# Patient Record
Sex: MALE | Race: OTHER | HISPANIC OR LATINO | URBAN - METROPOLITAN AREA
[De-identification: names, ages, dates, MRNs, and addresses within clinical notes are randomized per-mention and may not be internally consistent; named-entity substitution may affect disease eponyms.]

---

## 2018-07-14 ENCOUNTER — EMERGENCY (EMERGENCY)
Facility: HOSPITAL | Age: 48
LOS: 1 days | Discharge: HOME | End: 2018-07-14
Attending: EMERGENCY MEDICINE
Payer: COMMERCIAL

## 2018-07-14 VITALS
SYSTOLIC BLOOD PRESSURE: 137 MMHG | WEIGHT: 184.97 LBS | OXYGEN SATURATION: 98 % | TEMPERATURE: 98 F | HEIGHT: 72 IN | RESPIRATION RATE: 19 BRPM | DIASTOLIC BLOOD PRESSURE: 79 MMHG | HEART RATE: 77 BPM

## 2018-07-14 VITALS
RESPIRATION RATE: 17 BRPM | SYSTOLIC BLOOD PRESSURE: 119 MMHG | DIASTOLIC BLOOD PRESSURE: 75 MMHG | OXYGEN SATURATION: 98 % | TEMPERATURE: 98 F | HEART RATE: 68 BPM

## 2018-07-14 PROCEDURE — 73564 X-RAY EXAM KNEE 4 OR MORE: CPT | Mod: 26,LT

## 2018-07-14 PROCEDURE — 99283 EMERGENCY DEPT VISIT LOW MDM: CPT

## 2018-07-14 PROCEDURE — 73564 X-RAY EXAM KNEE 4 OR MORE: CPT

## 2018-07-14 RX ORDER — COLCHICINE 0.6 MG
1.2 TABLET ORAL ONCE
Qty: 0 | Refills: 0 | Status: COMPLETED | OUTPATIENT
Start: 2018-07-14 | End: 2018-07-14

## 2018-07-14 RX ORDER — COLCHICINE 0.6 MG
1 TABLET ORAL
Qty: 10 | Refills: 0 | OUTPATIENT
Start: 2018-07-14 | End: 2018-07-18

## 2018-07-14 RX ADMIN — Medication 1.2 MILLIGRAM(S): at 20:06

## 2018-07-14 NOTE — ED PROVIDER NOTE - PROGRESS NOTE DETAILS
Xray w/ small knee effusion. Will d/c home with colchicine, NSAIDs, PCP f/u  Spoke with patient extensively regarding current differential diagnosis for ongoing symptoms, and patient acknowledged understanding. Discussed plan for care and patient is in agreement.   Will provide strict return precautions and medication counseling.   Dany Alexander MD, PGY2 Xray w/ small knee effusion. Will d/c home with colchicine, NSAIDs, PCP f/u  Spoke with patient extensively regarding current differential diagnosis for ongoing symptoms, and patient acknowledged understanding. Discussed plan for care and patient is in agreement.   Will provide strict return precautions and medication counseling.   Dany Alexander MD, PGY2  **ATTENDING ADDENDUM (Dr. Ector Wing): agree with above notation by EM resident Catherine. Agree with goals/plan of ED care as described in EMR, including diagnostics, therapeutics and consultation as clinically warranted. Considered joint aspiration, but deferred this intervention as the soft-tissue swelling is not severe. Anticipatory guidance provided by ED team at time of evaluation.

## 2018-07-14 NOTE — ED PROVIDER NOTE - MUSCULOSKELETAL [+], MLM
JOINT PAIN **ATTENDING ADDENDUM (Dr. Ector Wing): POSITIVE left knee erythema, warmth, tenderness and joint soft-tissue swelling (small-to-moderate effusion)./JOINT PAIN

## 2018-07-14 NOTE — ED PROVIDER NOTE - PHYSICAL EXAMINATION
left knee: tender to touch to all anterior/lateral regions. Erythematous. Swollen. Effusion felt to superior/anterior knee. Full ROM. Warm to touch.    Able to ambulate w/o assistance  no swelling noted to other joints  No tenderness to popliteal region  no calf swelling or pain noted left knee: tender to touch to all anterior/lateral regions. Erythematous. Swollen. Effusion felt to superior/anterior knee. Full ROM. Warm to touch.    Able to ambulate w/o assistance  no swelling noted to other joints  No tenderness to popliteal region  no calf swelling or pain noted  **ATTENDING ADDENDUM (Dr. Ector Wing): I have reviewed and substantially contributed to the elements of the PE as documented above. I have directly performed an examination of this patient in conjunction with the other members (EM resident/PA/NP) of the patient care team. Of note, and in addition to the above, the patient's left knee is able to extend to 180 degrees and flex to 90 degrees with minimal-to-no restriction in range of motion secondary to pain. POSITIVE ambulatory without gait assist devices. POSITIVE erythema (mild) and warmth of the anterior left knee, with soft-tissue swelling and mild-to-moderately sized effusion. NO lymphangitic spread. Minimal ballotment of the patella. NO laxity with valgus stress to joint. NO laxity with varus stress to joint. NO posterior tenderness. NO distal discoloration. NO edema in the bilateral lower extremities.

## 2018-07-14 NOTE — ED PROVIDER NOTE - RELIEVING FACTORS
non-steroidal anti-inflammatory/**ATTENDING ADDENDUM (Dr. Ector Wing): modest improvement/over the counter medication

## 2018-07-14 NOTE — ED PROVIDER NOTE - MEDICAL DECISION MAKING DETAILS
48M presents with >1 week left knee swelling, erythema, tenderness, warmth. Exam as above. High suspicion for gout. Will obtain xray knee. Will likely d/c home with ortho f/u, steroids, NSAIDs. Currently stable, no acute distress. Will continue to follow up and re-assess. Case discussed with Attending: Georgette Alexander MD, PGY2 48M presents with >1 week left knee swelling, erythema, tenderness, warmth. Exam as above. High suspicion for gout. Low clinical suspicion for septic joint. Will obtain xray knee. Will likely d/c home with ortho f/u, steroids, NSAIDs. Currently stable, no acute distress. Will continue to follow up and re-assess. Case discussed with Attending: Geogrette Alexander MD, PGY2

## 2018-07-14 NOTE — ED PROVIDER NOTE - CHIEF COMPLAINT
The patient is a 48y Male complaining of The patient is a 48y Male with prior history of gout presents complaining of left knee soft-tissue swelling, erythema, warmth and tenderness for the past several days.

## 2018-07-14 NOTE — ED PROVIDER NOTE - OBJECTIVE STATEMENT
48M, no med hx, presents with >1 week left knee swelling, redness, warmth, pain. Per patient, developed without preceding symptoms or trauma. Reports tenderness to the knee as well as significant swelling and warmth. Denies any associated fevers or chills, nausea or vomiting, headache, dizziness, abdominal pain, urinary or UTI sx. Denies any symptoms in any other joints. Reports similar hx 3 yrs ago of BL knee swelling/warmth/erythema which resolved by itself after a few days. Patient states he went to Urgent Care a week ago shortly after sx began and had bloodwork / xray done. Labs there significant for uric acid 5.2, no xray report provided. patient states he has only been taking motrin, no other meds. Took motrin 600mg 1 hr prior to ED arrival. Reports eating lot of meat and beer recently. Denies any tobacco, drug use.  No allergies. No other meds. 48M, no med hx, presents with >1 week left knee swelling, redness, warmth, pain. Per patient, developed without preceding symptoms or trauma. Reports tenderness to the knee as well as significant swelling and warmth. Denies any associated fevers or chills, nausea or vomiting, headache, dizziness, abdominal pain, urinary or UTI sx. Denies any symptoms in any other joints. Reports similar hx 3 yrs ago of BL knee swelling/warmth/erythema which resolved by itself after a few days. Patient states he went to Urgent Care a week ago shortly after sx began and had bloodwork / xray done. Labs there significant for uric acid 5.2, no xray report provided. patient states he has only been taking motrin, no other meds. Took motrin 600mg 1 hr prior to ED arrival. Reports eating lot of meat and beer recently. Denies any tobacco, drug use.  No allergies. No other meds.  **ATTENDING ADDENDUM (Dr. Ector Wing): I attest that I have directly and personally interviewed and examined this patient and elicited a comparable history of present illness and review of systems as documented, along with my EM resident. I attest that I have made significant contributions to the documentation where necessary and as noted in the EMR. Of note, and in addition to the above, during exploration of patient's history, patient admits that a few years ago had a similar episode; patient seems to indicate that he had "gout" previously. NO recent history of trauma or infections. NO history of recent joint injections or aspirations. Had presented to urgent care center earlier in the week where phlebotomy was performed; uric acid elevated. POSITIVE dietary risks factors reviewed (POSITIVE alcohol and red meat). VAS 2/10.

## 2018-07-14 NOTE — ED PROVIDER NOTE - CARE PLAN
Principal Discharge DX:	Gout Principal Discharge DX:	Acute gout of left knee, unspecified cause  Goal:	ATTENDING ADDENDUM (Dr. Ector Wing): Goals of care include resolution of emergent/urgent symptoms and concerns, and restoration to baseline level of homeostasis.  Assessment and plan of treatment:	ATTENDING ADDENDUM (Dr. Ector Wing): (1) anticipatory guidance provided  (2) rest  (3) outpatient follow-up with your primary care physician/provider (4) return if symptoms worsen, persist, or do not resolve (5) medications, if indicated, as prescribed

## 2018-07-14 NOTE — ED PROVIDER NOTE - PLAN OF CARE
ATTENDING ADDENDUM (Dr. Ector Wing): Goals of care include resolution of emergent/urgent symptoms and concerns, and restoration to baseline level of homeostasis. ATTENDING ADDENDUM (Dr. Ector Wing): (1) anticipatory guidance provided  (2) rest  (3) outpatient follow-up with your primary care physician/provider (4) return if symptoms worsen, persist, or do not resolve (5) medications, if indicated, as prescribed

## 2018-07-14 NOTE — ED ADULT NURSE NOTE - OBJECTIVE STATEMENT
47 yo M presents to ED A+Ox3 c/o left knee pain and swelling. Pt. states over the past week his left knee has become red, swollen and painful. Pt. went to urgent care, had lab work done "that showed high uric acid" and was told to come to ED for further evaluation. Pt. states he had "something similar in the past" but "it went away on its own." Pt. denies fever, chills, numbness, tingling, difficulty ambulating. Left knee noted to be red, swollen and warm to touch. Afebrile at this time in the ED. Pt. able to ambulate with steady gait and bend knee. Breathing unlabored on RA. Skin warm dry and of color appropriate for ethnicity. Pt. took ibprofen approx. one hour ago. Comfort and safety measures in place.

## 2018-07-15 NOTE — ED POST DISCHARGE NOTE - DETAILS
Pt in ED for left knee swelling. Upon chart review, low suspicion for septic joint noted. Pt d/c on colchicine, naproxen, and given ortho f/u. As long as patient is without fever/chills or worsening symptoms, no further intervention needed at this time. Left message for call back. D/w Dr. Adams. - Nadja Rouse PA-C called with [employer] - id # [id number] - made aware of results advised follow up pmd. patient states completely unable to bear weight secondary to pain. advised continue meds and if unable to bear weight should be reevaluated here or with pmd - Meron Melara PA-C

## 2018-07-15 NOTE — ED POST DISCHARGE NOTE - RESULT SUMMARY
Radiology Discrepancy Reported On [place]: Xray L knee with final read of marked prepatellar soft tissue swelling

## 2021-06-09 ENCOUNTER — OUTPATIENT (OUTPATIENT)
Dept: OUTPATIENT SERVICES | Facility: HOSPITAL | Age: 51
LOS: 1 days | End: 2021-06-09

## 2021-06-09 DIAGNOSIS — Z11.52 ENCOUNTER FOR SCREENING FOR COVID-19: ICD-10-CM

## 2022-08-12 NOTE — ED PROVIDER NOTE - ATTENDING CONTRIBUTION TO CARE
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 Patient is doing well overall. He denies any pain. He has been doing physical therapy. \par  [de-identified] : Right Upper Extremity\par o Shoulder :\par ¦ Inspection/Palpation : no tenderness, no swelling, no deformities\par ¦ Range of Motion : ACTIVE FORWARD ELEVATION: Measured at 135 degrees, ACTIVE EXTERNAL ROTATION: Measured at 45 degrees, ACTIVE INTERNAL ROTATION: Measured at T12\par ¦ Strength : external rotation 5/5, internal rotation 5/5, supraspinatus 5/5\par ¦ Stability : no joint instability on provocative testing\par ¦ Tests/Signs : Neer (-), Wan (-). Apprehension (-)\par o Upper Arm : no tenderness, no swelling, no deformities\par o Muscle Bulk : no atrophy\par o Sensation : sensation intact to light touch\par o Skin : no skin rash or discoloration\par o Vascular Exam : no edema, no cyanosis, radial and ulnar pulses normal [de-identified] : 6 weeks s/p Right shoulder arthroscopic Bankart repair on 06/29/2022 [de-identified] : Continue outpatient physical therapy per protocol. \par FU 6 weeks.  **ATTENDING ADDENDUM (Dr. Ector Wing): I attest that I have directly examined this patient and reviewed and formulated the diagnostic and therapeutic management plan in collaboration with the EM resident. Please see MDM note and remainder of EMR for findings from CC, HPI, ROS, and PE. (Dym)